# Patient Record
Sex: FEMALE | Race: WHITE | NOT HISPANIC OR LATINO | Employment: OTHER | URBAN - METROPOLITAN AREA
[De-identification: names, ages, dates, MRNs, and addresses within clinical notes are randomized per-mention and may not be internally consistent; named-entity substitution may affect disease eponyms.]

---

## 2020-11-18 PROBLEM — M17.11 OSTEOARTHRITIS OF RIGHT KNEE: Status: ACTIVE | Noted: 2020-11-18

## 2020-11-18 PROBLEM — G89.4 CHRONIC PAIN DISORDER: Status: ACTIVE | Noted: 2020-11-18

## 2020-11-18 PROBLEM — E78.5 HYPERLIPIDEMIA: Status: ACTIVE | Noted: 2020-11-18

## 2020-11-18 PROBLEM — I77.3 FIBROMUSCULAR DYSPLASIA: Status: ACTIVE | Noted: 2020-11-18

## 2020-11-18 PROBLEM — L85.3 XEROSIS OF SKIN: Status: ACTIVE | Noted: 2020-11-18

## 2020-11-18 PROBLEM — G43.709 CHRONIC MIGRAINE WITHOUT AURA WITHOUT STATUS MIGRAINOSUS, NOT INTRACTABLE: Status: ACTIVE | Noted: 2018-11-06

## 2020-11-18 PROBLEM — E07.9 THYROID DISEASE: Status: RESOLVED | Noted: 2020-11-18 | Resolved: 2020-11-18

## 2020-11-18 PROBLEM — K59.00 CONSTIPATION: Status: ACTIVE | Noted: 2020-11-18

## 2020-11-18 PROBLEM — E07.9 THYROID DISEASE: Status: ACTIVE | Noted: 2020-11-18

## 2020-11-18 PROBLEM — E03.9 ACQUIRED HYPOTHYROIDISM: Status: ACTIVE | Noted: 2020-11-18

## 2020-11-18 PROBLEM — F41.9 ANXIETY: Status: ACTIVE | Noted: 2018-11-06

## 2020-11-18 PROBLEM — M19.90 ARTHRITIS: Status: ACTIVE | Noted: 2018-11-06

## 2020-11-18 PROBLEM — I10 ESSENTIAL HYPERTENSION: Status: ACTIVE | Noted: 2020-11-18

## 2020-11-18 PROBLEM — N28.9 RENAL DISEASE: Status: ACTIVE | Noted: 2018-11-06

## 2021-06-06 PROBLEM — G43.909 NONINTRACTABLE CHRONIC MIGRAINE: Status: ACTIVE | Noted: 2021-06-06

## 2022-06-01 ENCOUNTER — PATIENT OUTREACH (OUTPATIENT)
Dept: ADMINISTRATIVE | Facility: HOSPITAL | Age: 72
End: 2022-06-01

## 2022-06-01 NOTE — PROGRESS NOTES
[facility] CMS chart audits, BREAST CANCER SCREENING. Chart review completed for  test overdue (mammograms, Colonoscopies, pap smears, DM labs, and/or EYE EXAMs)      Care Everywhere and media, updates requested and reviewed.      NEEDS:  MAMMOGRAM  COLON CANCER SCREENING

## 2023-05-23 PROBLEM — F33.41 MAJOR DEPRESSIVE DISORDER, RECURRENT, IN PARTIAL REMISSION: Status: ACTIVE | Noted: 2023-05-23
